# Patient Record
Sex: FEMALE | Race: OTHER | NOT HISPANIC OR LATINO
[De-identification: names, ages, dates, MRNs, and addresses within clinical notes are randomized per-mention and may not be internally consistent; named-entity substitution may affect disease eponyms.]

---

## 2018-07-25 ENCOUNTER — APPOINTMENT (OUTPATIENT)
Dept: PULMONOLOGY | Facility: CLINIC | Age: 63
End: 2018-07-25
Payer: COMMERCIAL

## 2018-07-25 VITALS
WEIGHT: 250 LBS | OXYGEN SATURATION: 98 % | DIASTOLIC BLOOD PRESSURE: 70 MMHG | SYSTOLIC BLOOD PRESSURE: 120 MMHG | HEART RATE: 70 BPM | HEIGHT: 69 IN | BODY MASS INDEX: 37.03 KG/M2 | RESPIRATION RATE: 12 BRPM | TEMPERATURE: 98.3 F

## 2018-07-25 DIAGNOSIS — E66.01 MORBID (SEVERE) OBESITY DUE TO EXCESS CALORIES: ICD-10-CM

## 2018-07-25 DIAGNOSIS — R06.83 SNORING: ICD-10-CM

## 2018-07-25 DIAGNOSIS — R06.2 WHEEZING: ICD-10-CM

## 2018-07-25 DIAGNOSIS — I10 ESSENTIAL (PRIMARY) HYPERTENSION: ICD-10-CM

## 2018-07-25 DIAGNOSIS — R06.02 SHORTNESS OF BREATH: ICD-10-CM

## 2018-07-25 DIAGNOSIS — Z87.891 PERSONAL HISTORY OF NICOTINE DEPENDENCE: ICD-10-CM

## 2018-07-25 PROBLEM — Z00.00 ENCOUNTER FOR PREVENTIVE HEALTH EXAMINATION: Status: ACTIVE | Noted: 2018-07-25

## 2018-07-25 PROCEDURE — 99244 OFF/OP CNSLTJ NEW/EST MOD 40: CPT | Mod: 25

## 2018-07-25 PROCEDURE — 94060 EVALUATION OF WHEEZING: CPT

## 2018-07-25 PROCEDURE — 94729 DIFFUSING CAPACITY: CPT

## 2018-07-25 PROCEDURE — 94727 GAS DIL/WSHOT DETER LNG VOL: CPT

## 2018-07-25 RX ORDER — QUETIAPINE 50 MG/1
50 TABLET, FILM COATED ORAL
Refills: 0 | Status: ACTIVE | COMMUNITY

## 2018-07-25 RX ORDER — LITHIUM CARBONATE 600 MG/1
600 CAPSULE ORAL
Refills: 0 | Status: ACTIVE | COMMUNITY

## 2018-07-25 RX ORDER — LEVOTHYROXINE SODIUM 0.09 MG/1
88 TABLET ORAL
Refills: 0 | Status: ACTIVE | COMMUNITY

## 2018-07-25 RX ORDER — LAMOTRIGINE 200 MG/1
200 TABLET, ORALLY DISINTEGRATING ORAL
Refills: 0 | Status: ACTIVE | COMMUNITY

## 2018-07-25 RX ORDER — GABAPENTIN 100 MG/1
100 CAPSULE ORAL
Refills: 0 | Status: ACTIVE | COMMUNITY

## 2018-07-26 PROBLEM — R06.2 WHEEZING SYMPTOM: Status: ACTIVE | Noted: 2018-07-26

## 2018-10-03 ENCOUNTER — RESULT CHARGE (OUTPATIENT)
Age: 63
End: 2018-10-03

## 2021-10-06 PROBLEM — I10 ESSENTIAL HYPERTENSION: Status: ACTIVE | Noted: 2018-07-25

## 2022-03-12 ENCOUNTER — TRANSCRIPTION ENCOUNTER (OUTPATIENT)
Age: 67
End: 2022-03-12

## 2022-03-12 ENCOUNTER — OUTPATIENT (OUTPATIENT)
Dept: OUTPATIENT SERVICES | Facility: HOSPITAL | Age: 67
LOS: 1 days | End: 2022-03-12

## 2022-03-12 ENCOUNTER — APPOINTMENT (OUTPATIENT)
Dept: ULTRASOUND IMAGING | Facility: CLINIC | Age: 67
End: 2022-03-12
Payer: COMMERCIAL

## 2022-03-12 PROCEDURE — 76856 US EXAM PELVIC COMPLETE: CPT | Mod: 26

## 2022-03-12 PROCEDURE — 76700 US EXAM ABDOM COMPLETE: CPT | Mod: 26

## 2022-04-16 ENCOUNTER — OUTPATIENT (OUTPATIENT)
Dept: OUTPATIENT SERVICES | Facility: HOSPITAL | Age: 67
LOS: 1 days | End: 2022-04-16

## 2022-04-16 ENCOUNTER — APPOINTMENT (OUTPATIENT)
Dept: ULTRASOUND IMAGING | Facility: CLINIC | Age: 67
End: 2022-04-16
Payer: MEDICARE

## 2022-04-16 PROCEDURE — 76770 US EXAM ABDO BACK WALL COMP: CPT | Mod: 26

## 2023-06-06 ENCOUNTER — APPOINTMENT (OUTPATIENT)
Dept: ULTRASOUND IMAGING | Facility: CLINIC | Age: 68
End: 2023-06-06
Payer: MEDICARE

## 2023-06-06 ENCOUNTER — OUTPATIENT (OUTPATIENT)
Dept: OUTPATIENT SERVICES | Facility: HOSPITAL | Age: 68
LOS: 1 days | End: 2023-06-06

## 2023-06-06 PROCEDURE — 76857 US EXAM PELVIC LIMITED: CPT | Mod: 26

## 2023-06-06 PROCEDURE — 76700 US EXAM ABDOM COMPLETE: CPT | Mod: 26

## 2023-10-27 ENCOUNTER — APPOINTMENT (OUTPATIENT)
Dept: ULTRASOUND IMAGING | Facility: CLINIC | Age: 68
End: 2023-10-27
Payer: MEDICARE

## 2023-10-27 ENCOUNTER — OUTPATIENT (OUTPATIENT)
Dept: OUTPATIENT SERVICES | Facility: HOSPITAL | Age: 68
LOS: 1 days | End: 2023-10-27

## 2023-10-27 PROCEDURE — 76830 TRANSVAGINAL US NON-OB: CPT | Mod: 26

## 2023-10-27 PROCEDURE — 76700 US EXAM ABDOM COMPLETE: CPT | Mod: 26

## 2023-10-27 PROCEDURE — 76856 US EXAM PELVIC COMPLETE: CPT | Mod: 26,59

## 2023-11-13 ENCOUNTER — APPOINTMENT (OUTPATIENT)
Dept: PULMONOLOGY | Facility: CLINIC | Age: 68
End: 2023-11-13
Payer: MEDICARE

## 2023-11-13 VITALS
DIASTOLIC BLOOD PRESSURE: 62 MMHG | SYSTOLIC BLOOD PRESSURE: 118 MMHG | WEIGHT: 253 LBS | OXYGEN SATURATION: 98 % | HEART RATE: 74 BPM | HEIGHT: 69 IN | BODY MASS INDEX: 37.47 KG/M2 | TEMPERATURE: 97.2 F

## 2023-11-13 DIAGNOSIS — G47.33 OBSTRUCTIVE SLEEP APNEA (ADULT) (PEDIATRIC): ICD-10-CM

## 2023-11-13 DIAGNOSIS — Z23 ENCOUNTER FOR IMMUNIZATION: ICD-10-CM

## 2023-11-13 PROCEDURE — 99204 OFFICE O/P NEW MOD 45 MIN: CPT | Mod: 25

## 2023-11-13 PROCEDURE — G0009: CPT

## 2023-11-13 PROCEDURE — 90677 PCV20 VACCINE IM: CPT

## 2023-11-13 RX ORDER — ASPIRIN 81 MG
81 TABLET, DELAYED RELEASE (ENTERIC COATED) ORAL
Refills: 0 | Status: ACTIVE | COMMUNITY

## 2023-11-13 RX ORDER — LOSARTAN POTASSIUM 25 MG/1
25 TABLET, FILM COATED ORAL
Refills: 0 | Status: ACTIVE | COMMUNITY

## 2023-11-13 RX ORDER — SEMAGLUTIDE 1.34 MG/ML
INJECTION, SOLUTION SUBCUTANEOUS
Refills: 0 | Status: ACTIVE | COMMUNITY

## 2023-11-13 RX ORDER — CARVEDILOL 12.5 MG/1
12.5 TABLET, FILM COATED ORAL
Refills: 0 | Status: DISCONTINUED | COMMUNITY
End: 2023-11-13

## 2023-11-20 ENCOUNTER — TRANSCRIPTION ENCOUNTER (OUTPATIENT)
Age: 68
End: 2023-11-20

## 2023-11-20 VITALS
SYSTOLIC BLOOD PRESSURE: 122 MMHG | TEMPERATURE: 97 F | HEIGHT: 68 IN | DIASTOLIC BLOOD PRESSURE: 74 MMHG | OXYGEN SATURATION: 97 % | HEART RATE: 75 BPM | RESPIRATION RATE: 16 BRPM | WEIGHT: 178.57 LBS

## 2023-11-20 RX ORDER — SEMAGLUTIDE 0.68 MG/ML
1 INJECTION, SOLUTION SUBCUTANEOUS
Refills: 0 | DISCHARGE

## 2023-11-20 RX ORDER — POLYETHYLENE GLYCOL 3350 17 G/17G
17 POWDER, FOR SOLUTION ORAL
Refills: 0 | DISCHARGE

## 2023-11-20 RX ORDER — AMLODIPINE BESYLATE 2.5 MG/1
1 TABLET ORAL
Refills: 0 | DISCHARGE

## 2023-11-20 RX ORDER — ASPIRIN/CALCIUM CARB/MAGNESIUM 324 MG
1 TABLET ORAL
Refills: 0 | DISCHARGE

## 2023-11-20 RX ORDER — QUETIAPINE FUMARATE 200 MG/1
1 TABLET, FILM COATED ORAL
Refills: 0 | DISCHARGE

## 2023-11-20 NOTE — ASU PATIENT PROFILE, ADULT - NSICDXPASTSURGICALHX_GEN_ALL_CORE_FT
PAST SURGICAL HISTORY:  H/O eye surgery laser repair of torn retina    H/O left breast biopsy benign    H/O:  section     History of cholecystectomy     History of laparoscopy endometriosis excision     PAST SURGICAL HISTORY:  H/O eye surgery laser repair of torn retina, left    H/O left breast biopsy benign    H/O:  section     History of cholecystectomy     History of laparoscopy endometriosis excision

## 2023-11-20 NOTE — ASU PATIENT PROFILE, ADULT - NSICDXPASTMEDICALHX_GEN_ALL_CORE_FT
PAST MEDICAL HISTORY:  Dyspnea on exertion     Fatty liver     GERD (gastroesophageal reflux disease)     H/O bipolar disorder     HLD (hyperlipidemia)     HTN (hypertension)     Hypothyroidism     RIAZ (obstructive sleep apnea)      PAST MEDICAL HISTORY:  GERD (gastroesophageal reflux disease)     H/O bipolar disorder     H/O: depression     HLD (hyperlipidemia)     HTN (hypertension)     Hypothyroidism     RIAZ (obstructive sleep apnea) CPAP
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
## 2023-11-20 NOTE — ASU PATIENT PROFILE, ADULT - NS TRANSFER EYEGLASSES PAIRS
Medication: zaleplon (SONATA)  Dosage: 10 mg  Sig: Take 1 capsule by mouth every night as needed for insomnia  Qty: 30 capsule 5 refill  Last Refill: 11/19/19  Last Office Visit for this diagnosis: 2/13/2020  Next Appt:  None scheduled  Pharmacy Verified:  YES  Patient informed that refill may take up to 48 hours:   NO   1 pair

## 2023-11-20 NOTE — ASU PATIENT PROFILE, ADULT - NS PRO AD PATIENT TYPE
Darwin Sands (son)-[phone]/Health Care Proxy (HCP) Darwin Sands (son)-[phone]/Health Care Proxy (HCP)

## 2023-11-20 NOTE — ASU PATIENT PROFILE, ADULT - MEDICATIONS BROUGHT TO HOSPITAL, PROFILE
ASSESSMENT & PLAN: Aditya Gamboa is a 44 y o  [id number] with normal gynecologic exam     1   Routine well woman exam done today  2    Pap and HPV:Pap with HPV was done today  Current ASCCP Guidelines reviewed  Last Pap  [unfilled] :  no abnormalities  3  Mammogram ordered  Recommend yearly mammography  4   Discussed what to expect with kj-menopausal and menopause  5  The patient is sexually active  She declined contraception and options have been discussed  6  The following were reviewed in today's visit: breast self exam, mammography screening ordered, family planning choices and menopause  7  Patient to return to office in 12 months for WWE  All questions have been answered to her satisfaction  CC:  Annual Gynecologic Examination    HPI: Aditya Gamboa is a 44 y o  [id number] who presents for annual gynecologic examination  She has the following concerns:  None  Menses are monthly  States they always use to be q28 days, now can be up to q35  Cycles last 4 days, without any heavy bleeding  Health Maintenance:    She wears her seatbelt routinely  She does perform regular monthly self breast exams  She feels safe at home  Past Medical History:   Diagnosis Date   • Abnormal Pap smear of cervix     about 12 years ago   • Amenorrhea    • Anxiety 2016    On lexapro   • BRCA1 negative    • BRCA2 negative    • Cervicitis    • Pregnancy     LAST ASSESSED: 12   • Varicella        Past Surgical History:   Procedure Laterality Date   • FLEXOR TENDON REPAIR Left 2015   • WISDOM TOOTH EXTRACTION Bilateral        Past OB/Gyn History:  Period Cycle (Days): 28  Period Duration (Days): 4-5  Period Pattern: Regular  Menstrual Flow: Moderate  Dysmenorrhea: NonePatient's last menstrual period was 2023 (exact date)    Menstrual History:  OB History        2    Para   2    Term   2            AB        Living   2       SAB        IAB        Ectopic        Multiple Live Births   2                  Patient's last menstrual period was 01/21/2023 (exact date)  Period Cycle (Days): 28  Period Duration (Days): 4-5  Period Pattern: Regular  Menstrual Flow: Moderate  Dysmenorrhea: None    History of sexually transmitted infection No  Patient is currently sexually active  heterosexual Birth control: condoms  Last Pap  [unfilled] :  no abnormalities  Family History   Problem Relation Age of Onset   • Ovarian cancer Mother         Age 45-currently living   • Cancer Mother         Ovarian cancer-25 years ago-living   • Hyperlipidemia Father    • No Known Problems Sister    • Colon cancer Paternal Grandmother         78   • Hypertension Paternal Grandfather        Social History:  Social History     Socioeconomic History   • Marital status: /Civil Union     Spouse name: Not on file   • Number of children: Not on file   • Years of education: Not on file   • Highest education level: Not on file   Occupational History   • Not on file   Tobacco Use   • Smoking status: Never   • Smokeless tobacco: Never   Vaping Use   • Vaping Use: Never used   Substance and Sexual Activity   • Alcohol use: Not Currently     Comment: BEING A SOCIAL DRINKER   • Drug use: No   • Sexual activity: Yes     Partners: Male     Birth control/protection: Condom Male   Other Topics Concern   • Not on file   Social History Narrative    NO CAFFEINE USE AS PER [employer]    COFFEE AS PER [employer]    EXERCISE FREQUENCY (TIMES/WEEK)    EXERCISING REGULARLY     Social Determinants of Health     Financial Resource Strain: Not on file   Food Insecurity: Not on file   Transportation Needs: Not on file   Physical Activity: Not on file   Stress: Not on file   Social Connections: Not on file   Intimate Partner Violence: Not on file   Housing Stability: Not on file     Presently lives with spouse and 2 kids  Patient is     Patient is currently employed nurse for [employer]  Allergies   Allergen Reactions   • Penicillins        Current Outpatient Medications:   •  escitalopram (LEXAPRO) 10 mg tablet, Take 1 tablet (10 mg total) by mouth daily, Disp: 90 tablet, Rfl: 1    Review of Systems:  Review of Systems   Constitutional: Negative for activity change, chills, fever and unexpected weight change  HENT: Negative for congestion, ear pain, hearing loss and sore throat  Respiratory: Negative for cough, chest tightness and shortness of breath  Cardiovascular: Negative for chest pain and leg swelling  Gastrointestinal: Negative for abdominal pain, constipation, diarrhea, nausea and vomiting  Genitourinary: Negative for difficulty urinating, dysuria, frequency, menstrual problem, pelvic pain, vaginal discharge and vaginal pain  Skin: Negative for color change and rash  Neurological: Negative for dizziness, numbness and headaches  Psychiatric/Behavioral: Negative for agitation and confusion  Physical Exam:  /84 (BP Location: Left arm, Patient Position: Sitting, Cuff Size: Large)   Ht 5' 3" (1 6 m)   Wt 92 1 kg (203 lb)   LMP 01/21/2023 (Exact Date)   BMI 35 96 kg/m²    Physical Exam  Constitutional:       General: She is not in acute distress  Appearance: Normal appearance  She is obese  Genitourinary:      Vulva, bladder, rectum and urethral meatus normal       No lesions in the vagina  Right Labia: No rash, tenderness or lesions  Left Labia: No tenderness, lesions or rash  No labial fusion noted  No vaginal discharge or tenderness  No vaginal prolapse present  No vaginal atrophy present  Right Adnexa: not tender, not full and no mass present  Left Adnexa: not tender, not full and no mass present  No cervical motion tenderness or friability  Uterus is not enlarged or prolapsed  Breasts:     Breasts are soft  Right: No inverted nipple, mass, nipple discharge or skin change        Left: No inverted nipple, mass, nipple discharge or skin change  HENT:      Head: Normocephalic and atraumatic  Nose: Nose normal    Eyes:      Conjunctiva/sclera: Conjunctivae normal       Pupils: Pupils are equal, round, and reactive to light  Cardiovascular:      Rate and Rhythm: Normal rate and regular rhythm  Pulses: Normal pulses  Heart sounds: Normal heart sounds  Pulmonary:      Effort: Pulmonary effort is normal  No respiratory distress  Breath sounds: Normal breath sounds  No wheezing  Abdominal:      General: Abdomen is flat  There is no distension  Palpations: Abdomen is soft  Tenderness: There is no abdominal tenderness  There is no guarding  Musculoskeletal:         General: Normal range of motion  Cervical back: Normal range of motion and neck supple  Neurological:      General: No focal deficit present  Mental Status: She is alert and oriented to person, place, and time  Mental status is at baseline  Skin:     General: Skin is warm and dry  Psychiatric:         Mood and Affect: Mood normal          Behavior: Behavior normal          Thought Content: Thought content normal          Judgment: Judgment normal    Vitals and nursing note reviewed  no

## 2023-11-21 ENCOUNTER — TRANSCRIPTION ENCOUNTER (OUTPATIENT)
Age: 68
End: 2023-11-21

## 2023-11-21 ENCOUNTER — OUTPATIENT (OUTPATIENT)
Dept: INPATIENT UNIT | Facility: HOSPITAL | Age: 68
LOS: 1 days | Discharge: HOME | End: 2023-11-21
Payer: MEDICARE

## 2023-11-21 VITALS
OXYGEN SATURATION: 96 % | SYSTOLIC BLOOD PRESSURE: 125 MMHG | HEART RATE: 63 BPM | RESPIRATION RATE: 14 BRPM | DIASTOLIC BLOOD PRESSURE: 62 MMHG

## 2023-11-21 DIAGNOSIS — Z98.890 OTHER SPECIFIED POSTPROCEDURAL STATES: Chronic | ICD-10-CM

## 2023-11-21 DIAGNOSIS — Z90.49 ACQUIRED ABSENCE OF OTHER SPECIFIED PARTS OF DIGESTIVE TRACT: Chronic | ICD-10-CM

## 2023-11-21 DIAGNOSIS — Z98.891 HISTORY OF UTERINE SCAR FROM PREVIOUS SURGERY: Chronic | ICD-10-CM

## 2023-11-21 LAB
BLD GP AB SCN SERPL QL: NEGATIVE — SIGNIFICANT CHANGE UP
BLD GP AB SCN SERPL QL: NEGATIVE — SIGNIFICANT CHANGE UP
RH IG SCN BLD-IMP: POSITIVE — SIGNIFICANT CHANGE UP
RH IG SCN BLD-IMP: POSITIVE — SIGNIFICANT CHANGE UP

## 2023-11-21 PROCEDURE — 86900 BLOOD TYPING SEROLOGIC ABO: CPT

## 2023-11-21 PROCEDURE — 88305 TISSUE EXAM BY PATHOLOGIST: CPT

## 2023-11-21 PROCEDURE — 58558 HYSTEROSCOPY BIOPSY: CPT

## 2023-11-21 PROCEDURE — 88305 TISSUE EXAM BY PATHOLOGIST: CPT | Mod: 26

## 2023-11-21 PROCEDURE — 86901 BLOOD TYPING SEROLOGIC RH(D): CPT

## 2023-11-21 PROCEDURE — 86850 RBC ANTIBODY SCREEN: CPT

## 2023-11-21 DEVICE — MYOSURE TISSUE REMOVAL DEVICE XL: Type: IMPLANTABLE DEVICE | Status: FUNCTIONAL

## 2023-11-21 DEVICE — MYOSURE TISSUE REMOVAL DEVICE REACH: Type: IMPLANTABLE DEVICE | Status: FUNCTIONAL

## 2023-11-21 RX ORDER — SODIUM CHLORIDE 9 MG/ML
1000 INJECTION, SOLUTION INTRAVENOUS
Refills: 0 | Status: DISCONTINUED | OUTPATIENT
Start: 2023-11-21 | End: 2023-11-21

## 2023-11-21 RX ORDER — QUETIAPINE FUMARATE 200 MG/1
3 TABLET, FILM COATED ORAL
Refills: 0 | DISCHARGE

## 2023-11-21 RX ORDER — ACETAMINOPHEN 500 MG
1000 TABLET ORAL EVERY 6 HOURS
Refills: 0 | Status: DISCONTINUED | OUTPATIENT
Start: 2023-11-21 | End: 2023-11-21

## 2023-11-21 RX ORDER — KETOROLAC TROMETHAMINE 30 MG/ML
30 SYRINGE (ML) INJECTION EVERY 8 HOURS
Refills: 0 | Status: DISCONTINUED | OUTPATIENT
Start: 2023-11-21 | End: 2023-11-21

## 2023-11-21 RX ORDER — ERGOCALCIFEROL 1.25 MG/1
1 CAPSULE ORAL
Refills: 0 | DISCHARGE

## 2023-11-21 RX ORDER — LAMOTRIGINE 25 MG/1
1 TABLET, ORALLY DISINTEGRATING ORAL
Refills: 0 | DISCHARGE

## 2023-11-21 RX ORDER — LITHIUM CARBONATE 300 MG/1
1 TABLET, EXTENDED RELEASE ORAL
Refills: 0 | DISCHARGE

## 2023-11-21 RX ORDER — LOSARTAN POTASSIUM 100 MG/1
1 TABLET, FILM COATED ORAL
Refills: 0 | DISCHARGE

## 2023-11-21 RX ORDER — GABAPENTIN 400 MG/1
1 CAPSULE ORAL
Refills: 0 | DISCHARGE

## 2023-11-21 RX ORDER — SODIUM CHLORIDE 9 MG/ML
1000 INJECTION INTRAMUSCULAR; INTRAVENOUS; SUBCUTANEOUS
Refills: 0 | Status: DISCONTINUED | OUTPATIENT
Start: 2023-11-21 | End: 2023-11-21

## 2023-11-21 RX ORDER — ROSUVASTATIN CALCIUM 5 MG/1
1 TABLET ORAL
Refills: 0 | DISCHARGE

## 2023-11-21 RX ORDER — LEVOTHYROXINE SODIUM 125 MCG
1 TABLET ORAL
Refills: 0 | DISCHARGE

## 2023-11-21 RX ORDER — ONDANSETRON 8 MG/1
8 TABLET, FILM COATED ORAL ONCE
Refills: 0 | Status: DISCONTINUED | OUTPATIENT
Start: 2023-11-21 | End: 2023-11-21

## 2023-11-21 NOTE — ASU DISCHARGE PLAN (ADULT/PEDIATRIC) - BATHING
Samples Given: Tremfya  100mg press injector pen\\nLOT LDS10. AF\\n03/2023 \\nNDS# 09787-284-95\\n\\nEnstilar spray Samples Given: Tremfya  100mg press injector pen\\nLOT LDS10. AF\\n03/2023 \\nNDS# 75741-495-12\\n\\nEnstilar spray Shower only

## 2023-11-21 NOTE — PRE-ANESTHESIA EVALUATION ADULT - ANESTHESIA, PREVIOUS REACTION, PROFILE
Take prescriptions as directed. You need to continue taking your protonix as directed instead of as needed, it will work better this way. Follow up with your primary care provider in 1 week for recheck and continued care and management. Return to the ED for worsening signs and symptoms or otherwise as needed.     
none

## 2023-11-21 NOTE — BRIEF OPERATIVE NOTE - OPERATION/FINDINGS
Normal external genitalia, atrophic vagina, stenotic cervix. Cervix serially dilated to #16 dilator, hysteroscope inserted. Small posterior R lateral submucosal fibroid resected w/ myosure. B/l ostia identified. Endocervical polyp removed w/

## 2023-11-21 NOTE — ASU DISCHARGE PLAN (ADULT/PEDIATRIC) - NS MD DC FALL RISK RISK
For information on Fall & Injury Prevention, visit: [email or web address] OR  [email or web address] OR  https://www.cdc.gov/steadi/patient.html

## 2023-11-21 NOTE — ASU DISCHARGE PLAN (ADULT/PEDIATRIC) - ASU DC SPECIAL INSTRUCTIONSFT
- Nothing in vagina - no intercourse, tampons, or douching until cleared by your doctor.   - Avoid swimming, tub baths, strenuous activity and heavy lifting until cleared by your doctor.   - Showering is ok.   - Continue oral pain medications as needed for pain.    - Follow up in office in 1-2 weeks for your postoperative visit.  Call the office to confirm your follow up appointment.   - Call the office sooner if you develop severe pain, heavy vaginal bleeding greater than 2 pads in 2 hours, or fevers greater than 100.4 F. Go to the closest emergency room for any of these symptoms if you are not able to contact your doctor. - Nothing in vagina - no intercourse, tampons, or douching until cleared by your doctor.   - Avoid swimming, tub baths, strenuous activity and heavy lifting until cleared by your doctor.   - Showering is ok.   - Continue oral pain medications as needed for pain.    - Follow up in office in 1-2 weeks for your postoperative visit.  Call the office to confirm your follow up appointment.   - Call the office sooner if you develop severe pain, heavy vaginal bleeding greater than 2 pads in 2 hours, or fevers greater than 100.4 F. Go to the closest emergency room for any of these symptoms if you are not able to contact your doctor.   - Continue all home medications

## 2023-11-21 NOTE — ASU DISCHARGE PLAN (ADULT/PEDIATRIC) - CARE PROVIDER_API CALL
Mahogany Gonzalez  Obstetrics and Gynecology  [address], [address]  [place] [address]  Phone: [phone]  Fax: [phone]  Established Patient  Follow Up Time:

## 2023-11-26 ENCOUNTER — EMERGENCY (EMERGENCY)
Facility: HOSPITAL | Age: 68
LOS: 1 days | Discharge: HOME | End: 2023-11-26
Attending: EMERGENCY MEDICINE | Admitting: EMERGENCY MEDICINE
Payer: MEDICARE

## 2023-11-26 VITALS
HEIGHT: 68 IN | RESPIRATION RATE: 18 BRPM | SYSTOLIC BLOOD PRESSURE: 135 MMHG | HEART RATE: 68 BPM | DIASTOLIC BLOOD PRESSURE: 68 MMHG | OXYGEN SATURATION: 98 % | TEMPERATURE: 98 F | WEIGHT: 179.9 LBS

## 2023-11-26 VITALS
SYSTOLIC BLOOD PRESSURE: 110 MMHG | TEMPERATURE: 98 F | DIASTOLIC BLOOD PRESSURE: 64 MMHG | RESPIRATION RATE: 17 BRPM | HEART RATE: 63 BPM | OXYGEN SATURATION: 95 %

## 2023-11-26 DIAGNOSIS — Z98.891 HISTORY OF UTERINE SCAR FROM PREVIOUS SURGERY: Chronic | ICD-10-CM

## 2023-11-26 DIAGNOSIS — Z98.890 OTHER SPECIFIED POSTPROCEDURAL STATES: Chronic | ICD-10-CM

## 2023-11-26 DIAGNOSIS — Z90.49 ACQUIRED ABSENCE OF OTHER SPECIFIED PARTS OF DIGESTIVE TRACT: Chronic | ICD-10-CM

## 2023-11-26 LAB
ALBUMIN SERPL ELPH-MCNC: 3.9 G/DL — SIGNIFICANT CHANGE UP (ref 3.3–5)
ALBUMIN SERPL ELPH-MCNC: 3.9 G/DL — SIGNIFICANT CHANGE UP (ref 3.3–5)
ALP SERPL-CCNC: 90 U/L — SIGNIFICANT CHANGE UP (ref 40–120)
ALP SERPL-CCNC: 90 U/L — SIGNIFICANT CHANGE UP (ref 40–120)
ALT FLD-CCNC: 39 U/L — SIGNIFICANT CHANGE UP (ref 10–45)
ALT FLD-CCNC: 39 U/L — SIGNIFICANT CHANGE UP (ref 10–45)
APPEARANCE UR: CLEAR — SIGNIFICANT CHANGE UP
APPEARANCE UR: CLEAR — SIGNIFICANT CHANGE UP
APTT BLD: 29.1 SEC — SIGNIFICANT CHANGE UP (ref 24.5–35.6)
APTT BLD: 29.1 SEC — SIGNIFICANT CHANGE UP (ref 24.5–35.6)
AST SERPL-CCNC: 39 U/L — SIGNIFICANT CHANGE UP (ref 10–40)
AST SERPL-CCNC: 39 U/L — SIGNIFICANT CHANGE UP (ref 10–40)
BACTERIA # UR AUTO: NEGATIVE /HPF — SIGNIFICANT CHANGE UP
BACTERIA # UR AUTO: NEGATIVE /HPF — SIGNIFICANT CHANGE UP
BILIRUB DIRECT SERPL-MCNC: 0.2 MG/DL — SIGNIFICANT CHANGE UP (ref 0–0.3)
BILIRUB DIRECT SERPL-MCNC: 0.2 MG/DL — SIGNIFICANT CHANGE UP (ref 0–0.3)
BILIRUB INDIRECT FLD-MCNC: 0.2 MG/DL — SIGNIFICANT CHANGE UP (ref 0.2–1)
BILIRUB INDIRECT FLD-MCNC: 0.2 MG/DL — SIGNIFICANT CHANGE UP (ref 0.2–1)
BILIRUB SERPL-MCNC: 0.4 MG/DL — SIGNIFICANT CHANGE UP (ref 0.2–1.2)
BILIRUB SERPL-MCNC: 0.4 MG/DL — SIGNIFICANT CHANGE UP (ref 0.2–1.2)
BILIRUB UR-MCNC: NEGATIVE — SIGNIFICANT CHANGE UP
BILIRUB UR-MCNC: NEGATIVE — SIGNIFICANT CHANGE UP
CAST: 1 /LPF — SIGNIFICANT CHANGE UP (ref 0–4)
CAST: 1 /LPF — SIGNIFICANT CHANGE UP (ref 0–4)
COLOR SPEC: YELLOW — SIGNIFICANT CHANGE UP
COLOR SPEC: YELLOW — SIGNIFICANT CHANGE UP
DIFF PNL FLD: ABNORMAL
DIFF PNL FLD: ABNORMAL
GLUCOSE UR QL: NEGATIVE MG/DL — SIGNIFICANT CHANGE UP
GLUCOSE UR QL: NEGATIVE MG/DL — SIGNIFICANT CHANGE UP
INR BLD: 0.98 — SIGNIFICANT CHANGE UP (ref 0.85–1.18)
INR BLD: 0.98 — SIGNIFICANT CHANGE UP (ref 0.85–1.18)
KETONES UR-MCNC: NEGATIVE MG/DL — SIGNIFICANT CHANGE UP
KETONES UR-MCNC: NEGATIVE MG/DL — SIGNIFICANT CHANGE UP
LEUKOCYTE ESTERASE UR-ACNC: ABNORMAL
LEUKOCYTE ESTERASE UR-ACNC: ABNORMAL
LIDOCAIN IGE QN: 41 U/L — SIGNIFICANT CHANGE UP (ref 7–60)
LIDOCAIN IGE QN: 41 U/L — SIGNIFICANT CHANGE UP (ref 7–60)
NITRITE UR-MCNC: NEGATIVE — SIGNIFICANT CHANGE UP
NITRITE UR-MCNC: NEGATIVE — SIGNIFICANT CHANGE UP
PH UR: 7 — SIGNIFICANT CHANGE UP (ref 5–8)
PH UR: 7 — SIGNIFICANT CHANGE UP (ref 5–8)
PROT SERPL-MCNC: 6.5 G/DL — SIGNIFICANT CHANGE UP (ref 6–8.3)
PROT SERPL-MCNC: 6.5 G/DL — SIGNIFICANT CHANGE UP (ref 6–8.3)
PROT UR-MCNC: NEGATIVE MG/DL — SIGNIFICANT CHANGE UP
PROT UR-MCNC: NEGATIVE MG/DL — SIGNIFICANT CHANGE UP
PROTHROM AB SERPL-ACNC: 11.2 SEC — SIGNIFICANT CHANGE UP (ref 9.5–13)
PROTHROM AB SERPL-ACNC: 11.2 SEC — SIGNIFICANT CHANGE UP (ref 9.5–13)
RBC CASTS # UR COMP ASSIST: 15 /HPF — HIGH (ref 0–4)
RBC CASTS # UR COMP ASSIST: 15 /HPF — HIGH (ref 0–4)
SP GR SPEC: >1.03 — HIGH (ref 1–1.03)
SP GR SPEC: >1.03 — HIGH (ref 1–1.03)
SQUAMOUS # UR AUTO: 0 /HPF — SIGNIFICANT CHANGE UP (ref 0–5)
SQUAMOUS # UR AUTO: 0 /HPF — SIGNIFICANT CHANGE UP (ref 0–5)
UROBILINOGEN FLD QL: 0.2 MG/DL — SIGNIFICANT CHANGE UP (ref 0.2–1)
UROBILINOGEN FLD QL: 0.2 MG/DL — SIGNIFICANT CHANGE UP (ref 0.2–1)
WBC UR QL: 1 /HPF — SIGNIFICANT CHANGE UP (ref 0–5)
WBC UR QL: 1 /HPF — SIGNIFICANT CHANGE UP (ref 0–5)

## 2023-11-26 PROCEDURE — 76856 US EXAM PELVIC COMPLETE: CPT | Mod: 26,59

## 2023-11-26 PROCEDURE — 93005 ELECTROCARDIOGRAM TRACING: CPT

## 2023-11-26 PROCEDURE — 80076 HEPATIC FUNCTION PANEL: CPT

## 2023-11-26 PROCEDURE — 85025 COMPLETE CBC W/AUTO DIFF WBC: CPT

## 2023-11-26 PROCEDURE — 93010 ELECTROCARDIOGRAM REPORT: CPT

## 2023-11-26 PROCEDURE — 76856 US EXAM PELVIC COMPLETE: CPT

## 2023-11-26 PROCEDURE — 85730 THROMBOPLASTIN TIME PARTIAL: CPT

## 2023-11-26 PROCEDURE — 85610 PROTHROMBIN TIME: CPT

## 2023-11-26 PROCEDURE — 76830 TRANSVAGINAL US NON-OB: CPT | Mod: 26

## 2023-11-26 PROCEDURE — 36415 COLL VENOUS BLD VENIPUNCTURE: CPT

## 2023-11-26 PROCEDURE — 83690 ASSAY OF LIPASE: CPT

## 2023-11-26 PROCEDURE — 74177 CT ABD & PELVIS W/CONTRAST: CPT | Mod: 26,MA

## 2023-11-26 PROCEDURE — 74177 CT ABD & PELVIS W/CONTRAST: CPT | Mod: MA

## 2023-11-26 PROCEDURE — 99285 EMERGENCY DEPT VISIT HI MDM: CPT | Mod: 25

## 2023-11-26 PROCEDURE — 99285 EMERGENCY DEPT VISIT HI MDM: CPT

## 2023-11-26 PROCEDURE — 80048 BASIC METABOLIC PNL TOTAL CA: CPT

## 2023-11-26 PROCEDURE — 87086 URINE CULTURE/COLONY COUNT: CPT

## 2023-11-26 PROCEDURE — 76830 TRANSVAGINAL US NON-OB: CPT

## 2023-11-26 PROCEDURE — 81001 URINALYSIS AUTO W/SCOPE: CPT

## 2023-11-26 RX ORDER — SODIUM CHLORIDE 9 MG/ML
1000 INJECTION INTRAMUSCULAR; INTRAVENOUS; SUBCUTANEOUS ONCE
Refills: 0 | Status: COMPLETED | OUTPATIENT
Start: 2023-11-26 | End: 2023-11-26

## 2023-11-26 RX ORDER — IOHEXOL 300 MG/ML
30 INJECTION, SOLUTION INTRAVENOUS ONCE
Refills: 0 | Status: COMPLETED | OUTPATIENT
Start: 2023-11-26 | End: 2023-11-26

## 2023-11-26 RX ADMIN — SODIUM CHLORIDE 1000 MILLILITER(S): 9 INJECTION INTRAMUSCULAR; INTRAVENOUS; SUBCUTANEOUS at 20:37

## 2023-11-26 RX ADMIN — IOHEXOL 30 MILLILITER(S): 300 INJECTION, SOLUTION INTRAVENOUS at 20:37

## 2023-11-26 NOTE — ED PROVIDER NOTE - PHYSICAL EXAMINATION
VITAL SIGNS: I have reviewed nursing notes and confirm.  CONSTITUTIONAL: Well-developed; well-nourished; in no acute distress.  SKIN: Agree with RN documentation regarding decubitus evaluation. Remainder of skin exam is warm and dry, no acute rash.  HEAD: Normocephalic; atraumatic.  EYES: PERRL, EOM intact; conjunctiva and sclera clear.  ENT: No nasal discharge; airway clear.  NECK: Supple; non tender.  CARD: S1, S2 normal; no murmurs, gallops, or rubs. Regular rate and rhythm.  RESP: No wheezes, rales or rhonchi.  ABD: Hypoactive bowel sounds. Mild distention. No guarding. Tenderness to RLQ. No hepatosplenomegaly.  EXT: Normal ROM. No clubbing, cyanosis or edema.  LYMPH: No acute cervical adenopathy.  NEURO: Alert, oriented. Grossly unremarkable.  PSYCH: Cooperative, appropriate.   : Deferred to GYN team

## 2023-11-26 NOTE — ED PROVIDER NOTE - PATIENT PORTAL LINK FT
You can access the FollowMyHealth Patient Portal offered by [facility] by registering at the following website: [email or web address] By joining [employer]’s FollowMyHealth portal, you will also be able to view your health information using other applications (apps) compatible with our system.

## 2023-11-26 NOTE — ED ADULT NURSE NOTE - OBJECTIVE STATEMENT
Pt is a 67y/o F presenting to the ED w/ c/o of nausea, non-bloody vomiting, decreased eating/drinking and RLQ pain upon palpation that began today, following [facility]." [facility] from [facility] showed "full colon." Pt took Miralax x3d, had formed, non-bloody BM today. Pt has PMHx D/C procedure 5d ago, hernia, HTN (took meds today), bipolar disorder, GERD. Pt denies CP, dizziness, lightheadedness, diarrhea, fever, chills, cramps, vaginal discharge, blood in stool/urine. Pt A/Ox3, speaking in clear/coherent sentences. Respirations easy, even and unlabored on room air. Pt resting comfortably in stretcher. 20g IV placed in R AC. Labs drawn.

## 2023-11-26 NOTE — ED ADULT TRIAGE NOTE - CHIEF COMPLAINT QUOTE
Patient to the ED from urgent care c/o RLQ pain and n/v x 1 day. D&C procedure on 11/21. Denies fevers. AAOX4, NAD.

## 2023-11-26 NOTE — ED PROVIDER NOTE - NSCAREINITIATED _GEN_ER
2021       Eva Sanchez MD  [address] [place] [address]  Via In Basket      Patient: Marisol Toth   YOB: 1975   Date of Visit: 2021       Dear Dr. Sanchez:    I saw your patient, Marisol Toth, for an evaluation. Below are my notes for this visit with her.    If you have questions, please do not hesitate to call me.      Sincerely,        Cristhian Méndez MD        CC: No Recipients  Cristhian Méndez MD  2021  6:05 PM  Signed    Patient: Marisol Toth Date: 2021   : 1975 Attending: Cristhian Méndez MD   46 year old female      PRINCIPLE DIAGNOSIS:      1.  History of chronic ITP first diagnosed in 2006 ultimately requiring treatment of him remaining in remission following therapy with rituximab which have been completed in 2018.    2.  Pulmonary emboli treated with 6 months of anticoagulation therapy with Pradaxa in .    3.  Erythema nodosum    Cancer Staging Summary for Marisol Toth           REASON FOR VISIT:    Marisol Toth has a history of chronic ITP which was first diagnosed in 2006.  She only had a transient response to prednisone with persistent thrombocytopenia as she tapered off prednisone.  She also had a history of iron deficiency anemia due to menstrual blood loss.    She has had periodic recurrence of her ITP and has in the past required treatment with intravenous gammaglobulin, pulse dexamethasone, and rituximab.  All treatment had been completed in 2018 with subsequent normalization of her platelet count since that time.  She did not respond to initial therapy with pulsed dexamethasone.    Her hematologic history is also remarkable for a bilateral pulmonary embolism in 2016.  No underlying thrombophilia have been identified.  She completed a total of six months of anticoagulation therapy with Pradaxa in 2017.     She was hospitalized for two days in 2021 for what was  felt to be a recurrence of her chronic ITP. She received treatment with IV dexamethasone, 1 unit platelets, and 2 dose of IVIG with improvement in platelet count.    When seen in office on August 24, 2021 it was recommended she begin treatment with rituximab weekly for 4 weeks with treatment regimen being administered as maintenance every 6 months for 2 years. She began treatment on August 27, 2021.     9/17/2021    She returns for labs and to receive her fourth infusion of rituximab.    It is important to note that her recent rheumatologic studies performed in late August 2021 have showed a high sed rate and slightly increased SSA value.  She saw her rheumatologist earlier this week. She had labs done at the time of that visit and are pending as of today.    She will be receiving maintenance rituximab weekly for 4 weeks once every 6 months for a total of 2 years.  She will begin maintenance therapy with rituximab in late February or early March 2022.    DETAILED HEMATOLOGY HISTORY:    Marisol Toth initially presented with a platelet count of 30,000 and April 2006.  Her vitamin B12 and folate level were normal.  A bone marrow biopsy and aspirate which was done on April 13, 2006 revealed increased megakaryocytes, absent iron stores and an otherwise normal bone marrow.  She was treated by another hematologist with a short course of prednisone.  She was actually able to discontinue prednisone altogether by July 2006.    She received treatment with oral iron for iron deficiency anemia which was attributed to menstrual blood loss.    In February 2010 she presented with acute onset thrombocytopenia where she a platelet count of 13,000.  A prior CBC which had been done 2 years earlier had been normal.  She received WinRho followed by prednisone.  She was completely tapered off prednisone as of May 2010.  He was decided that she would receive alternative treatment such as pulsed dexamethasone or Rituxan if her platelet count  failed to remain normal off prednisone.  She continued to be followed in our office and then by her primary care physician.  After while she was doing a CBC every year.  Platelet count had remained normal.    She was seen in the office on December 7, 2016 with petechiae, purpura, rectal bleeding and oral bleeding along with epistaxis.  Her platelet count was 5000 she was subsequently hospitalized.  She received treatment with intravenous gammaglobulin at a dose of 110 g IV daily for 2 infusions beginning on December 7, 2016.  She also received dexamethasone 40 mg daily for 5 doses.  With the first infusion of intravenous gammaglobulin she had wheezing and minimal shortness of breath.  That may have been from volume overload.  She responded to a nebulizer treatment.  With the second infusion of intravenous gamma globulin she was given furosemide 20 mg IV along with 50 mg of IV hydrocortisone.  She had no respiratory issues with the second infusion.    The plan is for her to receive a total of 6 cycles of therapy with dexamethasone dose of 40 mg daily given for 4 days every 4 weeks.    On December 15, 2016 she had a CT angiogram of the chest done as she had substernal chest pain and shortness of breath.  Bilateral pulmonary emboli were noted in the right lower lobe and in the lingula.   A venous Doppler study of the lower extremities show no evidence of a deep venous thrombosis.  Bilateral popliteal cysts were noted.  She subsequently had CT scan of the abdomen and pelvis done to make sure that she did not have an occult malignancy or source of embolism.  Subsegmental atelectasis and consolidation was seen in the lingular segment of left upper lobe and a small left pleural effusion.  No other significant abnormality was seen on the CT scan.    She was started on Lovenox during her hospitalization.  She was discharged on Pradaxa 150 mg every 12 hours.  A thrombophilia evaluation was done.  She had a lupus anticoagulant  test which was abnormal.  The factor V Leiden test was normal.  The prothrombin gene mutation test was normal.  The protein C and protein S activity levels were not low.  An anticardiolipin antibody test was negative and a beta 2 glycoprotein I antibody test was negative.    A follow-up CT angiogram of the chest from March 2, 2017 showed no evidence of a residual pulmonary embolism.  No abnormalities were noted within the lung.    Her sixth and last cycle of pulse dexamethasone therapy began on May 9, 2017.    She completed her 6 months of anticoagulation therapy in mid June 2017.    On January 5, 2018 she noted the presence of petechiae along with mild epistaxis and a tendency towards bruising.  Her platelet count was 22,000.  She received dexamethasone 40 mg daily for 4 days beginning on January 5, 2017.  Her platelet count normalized within a few days.    Following her appointment on January 10, 2018 it was recommended that she begin Rituxan weekly for 4 weeks starting on January 12, 2018.  She completed her fourth infusion of Rituxan on February 16, 2018.  She did not require any further treatment with pulse dexamethasone.    On August 16, 2021 she reported having petechiae on her lower extremities for 3 to 4 days. I recommended that she go to the ER to be evaluated given her history of chronic ITP and thrombocytopenia. She went to the ER however left after waiting for three hours after she was told it would take an additional 10 hours for her to be seen. She was seen in the office the next day by Dr. Montero and had a CBC done at that time which revealed a platelet count of 6,000. It was recommended she go to the emergency room for further evaluation. She was subsequently hospitalized for 2 days and received treatment with 1 unit platelet transfusion, IV dexamethasone 40 mg, and 2 doses of IVIG for what was felt to be a flare up of her chronic ITP. Her platelet count improved over her hospitalization and was  noted to be 74,000 the day of discharge.    When seen in office on August 24, 2021 it was recommended she begin treatment with rituximab weekly for 4 weeks with this treatment regimen being administered every 6 months for 2 years as maintenance to prevent recurrence of her chronic ITP. She began treatment on August 27, 2021.    Laboratory studies done in late August 2021 revealed a high sed rate with a normal CRP.  The SSA was also mildly elevated.    She still has periodic episodes of erythema nodosum. Was recommended she again follow-up with her rheumatologist.  The rheumatologist ordered additional studies but did not feel that she has Sjogren's syndrome.    She completed the first set of infusions of rituximab on September 17, 2021.  Plans are for her to receive maintenance therapy with rituximab weekly for 4 weeks for a total of 2 years with this to be started in late February 2022.    PAST MEDICAL HISTORY:    She has a history of ITP as noted above with exacerbations in 2006 and in 2010.  She has had a history of erythema nodosum first noted in 2010.  She was never required treatment for this.  She is a low vitamin D level.  She has had pneumonia on 3 occasions in the past along with an occasional episode of bronchitis.  She had childhood asthma.  She had a cholecystectomy in 2010.    She has a history of erythema nodosum.    She was hospitalized at [facility] in May 2018 from lumbar degenerative disc disease with pain radiating down both legs. This improved with nonsteroidal anti-inflammatory medications along with a Lidoderm patch and physical therapy.  Symptoms improved over time.    Past Medical History:   Diagnosis Date   • Arthritis    • Asthma    • Erythema nodosum    • ITP (idiopathic thrombocytopenic purpura)    • Stress incontinence         Past Surgical History:   Procedure Laterality Date   • Cholecystectomy  08/17/12        Family History   Problem Relation Age of Onset   • Cancer Mother 64         ovarian   • Thyroid Mother    • Other Father         arthritis unknown         has a past surgical history that includes Cholecystectomy (08/17/12).       SUBJECTIVE:     She has been doing well.  She tolerated rituximab without difficulty.    She still has periodic episodes of erythema nodosum.  This has not happened recently.  The rheumatologist did not think that she has Sjogren's disease.  Additional labs have been ordered.    Flonase has helped with her rhinorrhea.    She is not having the generalized achiness this week as she is not on a steroid withdrawal.    No fevers, chills or sweats.  She still has occasional joint stiffness and pain.  She agrees to go back to see her rheumatologist.    She has been feeling well. She has had no unexpected bruising or bleeding.  Her energy level is excellent.  No signs of any infection. No chest palpitations, cough, shortness of breath. No GI or  issues.     She has had genetic testing given her family history of ovarian cancer. This came back negative.     She has not had a Covid 19 vaccination as of yet as she was concerned about receiving anything that would stimulate her immune system.  I would recommend that she receives her Covid vaccine about 2 months after completing rituximab.    All systems reviewed and are negative with the except of the findings noted above.    VITAL SIGNS:    Oncology Encounter Vitals [09/17/21 0938]   ONC OP Encounter Vitals Group      /77      Heart Rate (!) 103      Resp 16      Temp 97.3 °F (36.3 °C)      Temp src       SpO2 98 %      Weight 249 lb 3.2 oz (113 kg)      Height 5' 10\" (1.778 m)      Pain Score  0      Pain Location       Pain Education?       BSA (Calculated - m2) - Rosina [employer] Rosina 2.29      BMI (Calculated) 35.76       Weight    09/17/21 0938   Weight: 113 kg (249 lb 3.2 oz)        ECOG [09/17/21 1013]   ECOG Performance Status 0         PHYSICAL EXAMINATION:    General Appearance:  Alert, cooperative, no distress,    HEENT:  Normal conjuctivae, no icterus, no oral lesions or stomatitis,   Neck: Neck Supple, No thyromegally, Normal cervical, supraclavicular or infraclavicular lymphadenopathy  Lungs:  Clear, Normal symmetrical breath sounds, no tachypnea, no rhonchi, wheezing or rales  Heart:  Regular rate and rhythm, S1and S2 normal, no murmur, rub or gallop,   Abdomen:  Soft, non-tender, bowel sounds normal, no masses, no hepatomegally or splenomegally  Extremities:  No cyanosis or edema, no signs of a DVT, no localized spine tenderness.    Skin: Skin turgor normal, no rash, no petechiae or ecchymoses. One area of erythema nodosum in the left lower extremity which is not particularly red.   Lymph Nodes: Normal axillary, epitrochlear, inguinal and femoral lymph nodes  Neurologic:  CNII-XII intact, normal mentation, normal strength and gait  Psych:  cooperative    LABORATORY RESULTS:    Office Visit on 09/10/2021   Component Date Value Ref Range Status   • Sodium 09/10/2021 139  135 - 145 mmol/L Final   • Potassium 09/10/2021 3.9  3.4 - 5.1 mmol/L Final   • Chloride 09/10/2021 105  98 - 107 mmol/L Final   • Carbon Dioxide 09/10/2021 28  21 - 32 mmol/L Final   • Anion Gap 09/10/2021 10  10 - 20 mmol/L Final   • Glucose 09/10/2021 119* 65 - 99 mg/dL Final   • BUN 09/10/2021 17  6 - 20 mg/dL Final   • Creatinine 09/10/2021 0.80  0.51 - 0.95 mg/dL Final   • Glomerular Filtration Rate 09/10/2021 89  >=60 Final    eGFR results = or >60 mL/min/1.73m2 = Normal kidney function. Estimated GFR calculated using the 2009 CKD-EPI creatinine equation.     • BUN/ Creatinine Ratio 09/10/2021 21  7 - 25 Final   • Calcium 09/10/2021 9.3  8.4 - 10.2 mg/dL Final   • Bilirubin, Total 09/10/2021 0.4  0.2 - 1.0 mg/dL Final   • GOT/AST 09/10/2021 19  <=37 Units/L Final   • GPT/ALT 09/10/2021 28  <64 Units/L Final   • Alkaline Phosphatase 09/10/2021 70  45 - 117 Units/L Final   • Albumin 09/10/2021 3.4* 3.6 - 5.1 g/dL Final   • Protein, Total 09/10/2021  7.0  6.4 - 8.2 g/dL Final   • Globulin 09/10/2021 3.6  2.0 - 4.0 g/dL Final   • A/G Ratio 09/10/2021 0.9* 1.0 - 2.4 Final   • WBC 09/10/2021 6.2  4.2 - 11.0 K/mcL Final   • RBC 09/10/2021 4.92  4.00 - 5.20 mil/mcL Final   • HGB 09/10/2021 12.9  12.0 - 15.5 g/dL Final   • HCT 09/10/2021 39.6  36.0 - 46.5 % Final   • MCV 09/10/2021 80.5  78.0 - 100.0 fl Final   • MCH 09/10/2021 26.2  26.0 - 34.0 pg Final   • MCHC 09/10/2021 32.6  32.0 - 36.5 g/dL Final   • RDW-CV 09/10/2021 13.2  11.0 - 15.0 % Final   • RDW-SD 09/10/2021 39.1  39.0 - 50.0 fL Final   • PLT 09/10/2021 240  140 - 450 K/mcL Final   • Neutrophil, Percent 09/10/2021 65  % Final   • Lymphocytes, Percent 09/10/2021 26  % Final   • Mono, Percent 09/10/2021 5  % Final   • Eosinophils, Percent 09/10/2021 3  % Final   • Basophils, Percent 09/10/2021 1  % Final   • Immature Granulocytes 09/10/2021 0  % Final   • Absolute Neutrophils 09/10/2021 4.1  1.8 - 7.7 K/mcL Final   • Absolute Lymphocytes 09/10/2021 1.6  1.0 - 4.8 K/mcL Final   • Absolute Monocytes 09/10/2021 0.3  0.3 - 0.9 K/mcL Final   • Absolute Eosinophils  09/10/2021 0.2  0.0 - 0.5 K/mcL Final   • Absolute Basophils 09/10/2021 0.0  0.0 - 0.3 K/mcL Final   • Absolute Immmature Granulocytes 09/10/2021 0.0  0.0 - 0.2 K/mcL Final          GOT/AST (Units/L)   Date Value   09/10/2021 19     GPT/ALT (Units/L)   Date Value   09/10/2021 28     No results found for: GGTP  Alkaline Phosphatase (Units/L)   Date Value   09/10/2021 70     Bilirubin, Total (mg/dL)   Date Value   09/10/2021 0.4     Sodium (mmol/L)   Date Value   09/10/2021 139     Potassium (mmol/L)   Date Value   09/10/2021 3.9     Chloride (mmol/L)   Date Value   09/10/2021 105     Carbon Dioxide (mmol/L)   Date Value   09/10/2021 28     BUN (mg/dL)   Date Value   09/10/2021 17     Creatinine (mg/dL)   Date Value   09/10/2021 0.80     Glucose (mg/dL)   Date Value   09/10/2021 119 (H)       RADIOLOGY RESULTS:    n/a    MEDICATIONS:    Current  Outpatient Medications   Medication Sig Dispense Refill   • fluticasone (VERAMYST) 27.5 MCG/SPRAY nasal spray Spray 1 spray in each nostril daily.     • albuterol 108 (90 Base) MCG/ACT inhaler Inhale 2 puffs into the lungs every 4 hours as needed for Shortness of Breath or Wheezing.     • dexamethasone (DECADRON) 4 MG tablet On 8-20-21, take 10 pills ( 40 mg) as directed 10 tablet 0   • calcium-vitamin D (OSCAL-500) 500-200 MG-UNIT per tablet Take 1 tablet by mouth daily.      • pseudoephedrine (Sudafed 12 Hour) 120 MG 12 hr tablet Take 120 mg by mouth at bedtime.      • cetirizine (ZYRTEC) 10 MG tablet Take 10 mg by mouth 2 times daily.       No current facility-administered medications for this visit.        ALLERGIES:    ALLERGIES:   Allergen Reactions   • Fish    • Flu Virus Vaccine Other (See Comments)   • Mold   (Environmental)    • Nuts   (Food)    • Shellfish Allergy   (Food Or Med)         IMPRESSION/PLAN:     1.  Chronic ITP.      While she has had a response to prior treatment with pulse dexamethasone she developed a recurrence of her ITP with then 6 months of completing her pulse dexamethasone therapy.  I recommended for that reason that she go on to receive Rituxan after 1 course of pulse dexamethasone January 2018 result in improvement in her platelet count.     She has had a prior response to treatment with rituximab in the past.    She now has evidence of recurrent chronic ITP.  It had been approximately just over 3 1/2  years between when she required treatment for chronic ITP in the past and her current episode of ITP.    She does not have a clear diagnosis for rheumatologic problem. Her rheumatologist previously felt that she did not have a rheumatologic problem. It is possible that she has an undiagnosed or difficult to characterize immunologic problem.  She has had no recent events occur (infection or antigenic stimulants) that would have contributed to the current episode of ITP.     It is my  opinion that she has an underlying rheumatologic problem. She saw her rheumatologist earlier this week who is having her do several studies. It is possible that the rheumatologist may suggest that she go on a medication such as Plaquenil.  Either way I would recommend that she also receive maintenance rituximab.    She saw the rheumatologist.  Additional laboratory studies were ordered by the rheumatologist.  She was not started on any medication such as Plaquenil as of yet.  I will speak with her rheumatologist once the additional laboratory studies come back.    She would receive rituximab weekly for 4 weeks once every 6 months for 2 years rather than rituximab every 2 months.  She will start maintenance therapy with rituximab in late February or early March 2022.    At the present time she will continue rituximab weekly for 4 weeks.  If necessary she will receive another round of treatment with pulse dexamethasone.  I do not plan to give her scheduled treatment with pulse dexamethasone and I will recommend using this only if indicated based upon results of frequent CBC studies.  The 6 cycles of pulse dexamethasone was not effective.    Proceed with rituximab today per orders.    At the present time she will hold off on getting the COVID-19 vaccination.  I would recommend that she get set 2 months after completing rituximab.    2.  History of bilateral pulmonary emboli.     She completed her 6 months of anticoagulation therapy in mid June 2017. She has not had recurrent thrombosis since. She will continue efforts to lose weight and remain physically active. She knows to remain active when traveling. She may benefit from compression stockings.     3. Family history of ovarian cancer.      She had genetic testing done that was negative.     4.  Joint symptoms and history of erythema nodosum.      She saw her rheumatologist who recommended that additional labs to be done.  This was ordered today.  Any decisions  regarding additional treatment options other than rituximab will be determined by the rheumatologist.    She is to have CBC checked monthly and return to see me in 3 months.  I will follow her sed rate and the SSA and SSB antibody studies at the time of my visits with her.    Prognosis: Good  Treatment Intent: Cure      Recent PHQ 2/9 Score    PHQ 2:  Date Adult PHQ 2 Score Adult PHQ 2 Interpretation   9/17/2021 0 No further screening needed       PHQ 9:       Distress Score   Distress Management Group      Distress Scale (0-10)          Time Spent:    25 minutes.  More than 50% of the time was spent in face to face discussion with patient.  Moderate complexity.    Cristhian Méndez MD     Discussed with:  Patient                  Allie Juarez(Attending)

## 2023-11-26 NOTE — ED PROVIDER NOTE - OBJECTIVE STATEMENT
69 y/o F with a PMHx of CAD, endometrial thickening (recent DNC 6d ago), and cholecystectomy presents to the ED c/o vaginal spotting which resolved with no other vaginal discharge. Pt also c/o new nausea and vomiting but denies any diarrhea. Pt has had constipation for several months but was able to move bowels this morning with no melena or blood. Pt initially had dysuria after surgery which has resolved since then and had a ocampo placed at the time of procedure.

## 2023-11-26 NOTE — ED PROVIDER NOTE - CLINICAL SUMMARY MEDICAL DECISION MAKING FREE TEXT BOX
69 y/o F with persistent abdominal discomfort and new onset of nausea and vomiting 1w after DNC. Plan to assess for post-op complications for appendicitis, coelitis, malignancy, diverticulitis, constipation. Will give IV bolus and omnipaque. Will obtain labs including BMP, CBC, urine cultures, and UA. Will obtain imaging including US transvaginal and CT abdomen pelvis. GYN consulted. 67 y/o F with persistent abdominal discomfort and new onset of nausea and vomiting 1w after DNC. Plan to assess for post-op complications for appendicitis, colitis, malignancy, diverticulitis, constipation. Will give IV bolus and omnipaque. Will obtain labs including BMP, CBC, urine cultures, and UA. Will obtain imaging including US transvaginal and CT abdomen pelvis. GYN consulted.

## 2023-11-26 NOTE — ED PROVIDER NOTE - NSICDXPASTMEDICALHX_GEN_ALL_CORE_FT
PAST MEDICAL HISTORY:  GERD (gastroesophageal reflux disease)     H/O bipolar disorder     H/O: depression     HLD (hyperlipidemia)     HTN (hypertension)     Hypothyroidism     RIAZ (obstructive sleep apnea) CPAP

## 2023-11-26 NOTE — ED PROVIDER NOTE - NSFOLLOWUPINSTRUCTIONS_ED_ALL_ED_FT
Please take tylenol or motrin as needed for pain. Return for worsening symptoms, worsening pain. Please follow up with your GYN.     I hope you feel better soon, happy holidays!

## 2023-11-26 NOTE — ED ADULT TRIAGE NOTE - NS ED NURSE BANDS TYPE
Name band; Paramedian Forehead Flap Text: A decision was made to reconstruct the defect utilizing an interpolation axial flap and a staged reconstruction.  A telfa template was made of the defect.  This telfa template was then used to outline the paramedian forehead pedicle flap.  The donor area for the pedicle flap was then injected with anesthesia.  The flap was excised through the skin and subcutaneous tissue down to the layer of the underlying musculature.  The pedicle flap was carefully excised within this deep plane to maintain its blood supply.  The edges of the donor site were undermined.   The donor site was closed in a primary fashion.  The pedicle was then rotated into position and sutured.  Once the tube was sutured into place, adequate blood supply was confirmed with blanching and refill.  The pedicle was then wrapped with xeroform gauze and dressed appropriately with a telfa and gauze bandage to ensure continued blood supply and protect the attached pedicle.

## 2023-11-26 NOTE — ED PROVIDER NOTE - NSICDXPASTSURGICALHX_GEN_ALL_CORE_FT
PAST SURGICAL HISTORY:  H/O eye surgery laser repair of torn retina, left    H/O left breast biopsy benign    H/O:  section     History of cholecystectomy     History of laparoscopy endometriosis excision

## 2023-11-27 PROBLEM — E78.5 HYPERLIPIDEMIA, UNSPECIFIED: Chronic | Status: ACTIVE | Noted: 2023-11-20

## 2023-11-27 PROBLEM — K21.9 GASTRO-ESOPHAGEAL REFLUX DISEASE WITHOUT ESOPHAGITIS: Chronic | Status: ACTIVE | Noted: 2023-11-20

## 2023-11-27 PROBLEM — I10 ESSENTIAL (PRIMARY) HYPERTENSION: Chronic | Status: ACTIVE | Noted: 2023-11-20

## 2023-11-27 PROBLEM — Z86.59 PERSONAL HISTORY OF OTHER MENTAL AND BEHAVIORAL DISORDERS: Chronic | Status: ACTIVE | Noted: 2023-11-20

## 2023-11-27 PROBLEM — G47.33 OBSTRUCTIVE SLEEP APNEA (ADULT) (PEDIATRIC): Chronic | Status: ACTIVE | Noted: 2023-11-20

## 2023-11-27 PROBLEM — E03.9 HYPOTHYROIDISM, UNSPECIFIED: Chronic | Status: ACTIVE | Noted: 2023-11-20

## 2023-11-27 NOTE — CONSULT NOTE ADULT - SUBJECTIVE AND OBJECTIVE BOX
67yo  POD6 s/p hysteroscopic myomectomy for thickened endometrium presenting for nausea/vomiting and abdominal bloating. The patient reports that she has had chronic bloating for the past 2mo. She went to her PCP and was sent for a TVUS as a part of the workup. At that time, she was found to have thickened endometrium and was referred to Dr. Gonzalez for evaluation. She completed hysteroscopic myomectomy  and was discharged home POD0 after an uncomplicated case. On POD2, she went to urgent care for evaluation of a skin biopsy for concern for infection and also inquired about her abdominal bloating and was sent for an abdominal xray. She was told that she had "high stool burden" on xray. She then developed nausea and vomiting this morning. She reports 2 high volume episodes of vomiting this morning and her nausea has since resolved. She additionally reports RLQ abdominal pain that has persisted for the past 2mo. She states that she is not in pain at baseline, but has 5/10 pain when she presses on the area. Pt denies fever, chills, chest pain, SOB, vaginal bleeding.     OB/GYN Hx: ; pCS  twins  Denies hx of abnormal pap smears.  Hx of genital warts  Hx of endometriosis  PMHx: HTN, GERD, BPD, hypothyroidism   SHx: pCS , dx laparoscopy (endometriosis) , cholecystectomy , left breast lumpectomy   Meds: levothyroxine 88mcg, lithium 600mg qd, lamictal 200mg qd, seroquel 75mg qd, gabapentin 100mg qd, losartan 25mg qd   Allergies: NKDA    PHYSICAL EXAM:   Vital Signs Last 24 Hrs  T(C): 36.7 (2023 23:41), Max: 36.7 (2023 20:59)  T(F): 98 (2023 23:41), Max: 98 (2023 20:59)  HR: 63 (2023 23:41) (63 - 68)  BP: 110/64 (2023 23:41) (110/64 - 135/68)  RR: 17 (2023 23:41) (16 - 18)  SpO2: 95% (2023 23:41) (95% - 98%)    Parameters below as of 2023 23:41  Patient On (Oxygen Delivery Method): room air        **************************  Constitutional: NAD  Respiratory: Clear to ausculation bilaterally  Cardiovascular: regular rate and rhythm  Gastrointestinal: soft, mild tenderness to palpation of RLQ, positive bowel sounds, no rebound or guarding   Pelvic exam: deferred  Extremities: no calf tenderness or swelling    LABS:                        12.4   10.06 )-----------( 203      ( 2023 19:07 )             38.6         138  |  103  |  15  ----------------------------<  80  4.0   |  28  |  0.62    Ca    9.2      2023 19:07    TPro  6.5  /  Alb  3.9  /  TBili  0.4  /  DBili  0.2  /  AST  39  /  ALT  39  /  AlkPhos  90      PT/INR - ( 2023 21:31 )   PT: 11.2 sec;   INR: 0.98          PTT - ( 2023 21:31 )  PTT:29.1 sec  Urinalysis Basic - ( 2023 23:47 )    Color: Yellow / Appearance: Clear / SG: >1.030 / pH: x  Gluc: x / Ketone: Negative mg/dL  / Bili: Negative / Urobili: 0.2 mg/dL   Blood: x / Protein: Negative mg/dL / Nitrite: Negative   Leuk Esterase: Trace / RBC: 15 /HPF / WBC 1 /HPF   Sq Epi: x / Non Sq Epi: 0 /HPF / Bacteria: Negative /HPF          RADIOLOGY & ADDITIONAL STUDIES:    ACC: [id number] EXAM:  US TRANSVAGINAL   ORDERED BY: ANTOINETTE ROJAS     ACC: [id number] EXAM:  US PELVIC COMPLETE   ORDERED BY: ANTOINETTE ROJAS     PROCEDURE DATE:  2023          INTERPRETATION:  CLINICAL INFORMATION: Abdominal pain. Recent D&C for   endometrial thickening.    LMP: Postmenopausal    COMPARISON: Pelvic ultrasound from 10/27/2023.    TECHNIQUE:  Endovaginal and transabdominal pelvic sonogram. Color and Spectral   Doppler was performed.    FINDINGS:  Uterus: 6.1 x 3.4 x 4.3 cm. Within normal limits.  Endometrium: 13 mm. Thickened, avascular, and heterogenous endometrium.    Ovaries: Not visualized due to excessive bowel gas.    Fluid: None visualized.    IMPRESSION:  1.  Thickened, heterogenous, and avascular endometrium measuring up to   1.3 cm. Recommend correlation with recent D&C findings and follow-up with   gynecology.  2.  Nonvisualization of the ovaries due to excessive bowel gas.        --- End of Report ---          ROXANA IVY MD; Resident Radiologist  This document has been electronically signed.  ASTRID YANG MD; Attending Radiologist  This document has been electronically signed. 2023 11:32PM      ACC: [id number] EXAM:  CT ABDOMEN AND PELVIS OC IC   ORDERED BY: ANTOINETTE ROJAS     PROCEDURE DATE:  2023          INTERPRETATION:  CLINICAL INFORMATION: Abdominal pain, vomiting, and   constipation. Recent D&C for endometrial thickening.    COMPARISON: Abdominal and pelvic ultrasounds ultrasounds from 10/27/2023.    CONTRAST/COMPLICATIONS:  IV Contrast: Isovue 370  90 cc administered   10 cc discarded  Oral Contrast: Gastroview  Complications: None reported at time of study completion    PROCEDURE:  CT of the Abdomen and Pelvis was performed.  Sagittal and coronal reformats were performed.    FINDINGS:  LOWER CHEST: Tiny hiatal hernia.    LIVER: Within normal limits.  BILE DUCTS: Normal caliber.  GALLBLADDER: Cholecystectomy.  SPLEEN: Within normal limits.  PANCREAS: Within normal limits.  ADRENALS: Within normal limits.  KIDNEYS/URETERS: No renal stones or hydronephrosis. 1 cm right renal cyst   and 2 cm left renal cyst.    BLADDER: Within normal limits.  REPRODUCTIVE ORGANS: Redemonstration of thickened and heterogeneous   endometrium. Adnexae within normal limits.    BOWEL: Enteric contrast reaches the distal small bowel. No bowel   obstruction or appreciable wall thickening. Appendix is normal. Abundant   colonic feces.  PERITONEUM: No ascites.  VESSELS: Mild atherosclerotic changes.  RETROPERITONEUM/LYMPH NODES: No lymphadenopathy.  ABDOMINAL WALL: Small fat-containing umbilical hernia.  BONES: Degenerative changes.    IMPRESSION:  1.  No acute intra-abdominal pathology.  2.  Abundant colonic feces which corresponds with the provided history of   constipation.  3.  Redemonstration of thickened and heterogeneous endometrium. Continued   gynecologic follow-up is advised.        --- End of Report ---          ROXANA IVY MD; Resident Radiologist  This document has been electronically signed.  ASTRID YANG MD; Attending Radiologist  This document has been electronically signed. 2023 11:26PM   67yo  POD6 s/p hysteroscopic myomectomy for thickened endometrium presenting for nausea/vomiting and abdominal bloating. The patient reports that she has had chronic bloating for the past 2mo. She went to her PCP and was sent for a TVUS as a part of the workup. At that time, she was found to have thickened endometrium and was referred to Dr. Gonzalez for evaluation. She completed hysteroscopic myomectomy  and was discharged home POD0 after an uncomplicated case. On POD2, she went to urgent care for evaluation of a skin biopsy for concern for infection and also inquired about her abdominal bloating and was sent for an abdominal xray. She was told that she had "high stool burden" on xray. She then developed nausea and vomiting this morning. She reports 2 high volume episodes of vomiting this morning and her nausea has since resolved. She additionally reports RLQ abdominal pain that has persisted for the past 2mo. She states that she is not in pain at baseline, but has 5/10 pain when she presses on the area. Pt reports that she had 2 BM this week and that her BM are irregular. She is not currently on a bowel regimen but reports taking Miralax for the past 2 days after being told that the pain may be related to constipation after xray. Pt denies fever, chills, chest pain, SOB, vaginal bleeding.     OB/GYN Hx: ; pCS  twins  Denies hx of abnormal pap smears.  Hx of genital warts  Hx of endometriosis  PMHx: HTN, GERD, BPD, hypothyroidism   SHx: pCS , dx laparoscopy (endometriosis) , cholecystectomy , left breast lumpectomy   Meds: levothyroxine 88mcg, lithium 600mg qd, lamictal 200mg qd, seroquel 75mg qd, gabapentin 100mg qd, losartan 25mg qd   Allergies: NKDA    PHYSICAL EXAM:   Vital Signs Last 24 Hrs  T(C): 36.7 (2023 23:41), Max: 36.7 (2023 20:59)  T(F): 98 (2023 23:41), Max: 98 (2023 20:59)  HR: 63 (2023 23:41) (63 - 68)  BP: 110/64 (2023 23:41) (110/64 - 135/68)  RR: 17 (2023 23:41) (16 - 18)  SpO2: 95% (2023 23:41) (95% - 98%)    Parameters below as of 2023 23:41  Patient On (Oxygen Delivery Method): room air        **************************  Constitutional: NAD  Respiratory: Clear to ausculation bilaterally  Cardiovascular: regular rate and rhythm  Gastrointestinal: soft, mild tenderness to palpation of RLQ, positive bowel sounds, no rebound or guarding   Pelvic exam: deferred  Extremities: no calf tenderness or swelling    LABS:                        12.4   10.06 )-----------( 203      ( 2023 19:07 )             38.6         138  |  103  |  15  ----------------------------<  80  4.0   |  28  |  0.62    Ca    9.2      2023 19:07    TPro  6.5  /  Alb  3.9  /  TBili  0.4  /  DBili  0.2  /  AST  39  /  ALT  39  /  AlkPhos  90      PT/INR - ( 2023 21:31 )   PT: 11.2 sec;   INR: 0.98          PTT - ( 2023 21:31 )  PTT:29.1 sec  Urinalysis Basic - ( 2023 23:47 )    Color: Yellow / Appearance: Clear / SG: >1.030 / pH: x  Gluc: x / Ketone: Negative mg/dL  / Bili: Negative / Urobili: 0.2 mg/dL   Blood: x / Protein: Negative mg/dL / Nitrite: Negative   Leuk Esterase: Trace / RBC: 15 /HPF / WBC 1 /HPF   Sq Epi: x / Non Sq Epi: 0 /HPF / Bacteria: Negative /HPF          RADIOLOGY & ADDITIONAL STUDIES:    ACC: [id number] EXAM:  US TRANSVAGINAL   ORDERED BY: ANTOINETTE ROJAS     ACC: [id number] EXAM:  US PELVIC COMPLETE   ORDERED BY: ANTOINETTE ROJAS     PROCEDURE DATE:  2023          INTERPRETATION:  CLINICAL INFORMATION: Abdominal pain. Recent D&C for   endometrial thickening.    LMP: Postmenopausal    COMPARISON: Pelvic ultrasound from 10/27/2023.    TECHNIQUE:  Endovaginal and transabdominal pelvic sonogram. Color and Spectral   Doppler was performed.    FINDINGS:  Uterus: 6.1 x 3.4 x 4.3 cm. Within normal limits.  Endometrium: 13 mm. Thickened, avascular, and heterogenous endometrium.    Ovaries: Not visualized due to excessive bowel gas.    Fluid: None visualized.    IMPRESSION:  1.  Thickened, heterogenous, and avascular endometrium measuring up to   1.3 cm. Recommend correlation with recent D&C findings and follow-up with   gynecology.  2.  Nonvisualization of the ovaries due to excessive bowel gas.        --- End of Report ---          ROXANA IVY MD; Resident Radiologist  This document has been electronically signed.  ASTRID YANG MD; Attending Radiologist  This document has been electronically signed. 2023 11:32PM      ACC: [id number] EXAM:  CT ABDOMEN AND PELVIS OC IC   ORDERED BY: ANTOINETTE ROJAS     PROCEDURE DATE:  2023          INTERPRETATION:  CLINICAL INFORMATION: Abdominal pain, vomiting, and   constipation. Recent D&C for endometrial thickening.    COMPARISON: Abdominal and pelvic ultrasounds ultrasounds from 10/27/2023.    CONTRAST/COMPLICATIONS:  IV Contrast: Isovue 370  90 cc administered   10 cc discarded  Oral Contrast: Gastroview  Complications: None reported at time of study completion    PROCEDURE:  CT of the Abdomen and Pelvis was performed.  Sagittal and coronal reformats were performed.    FINDINGS:  LOWER CHEST: Tiny hiatal hernia.    LIVER: Within normal limits.  BILE DUCTS: Normal caliber.  GALLBLADDER: Cholecystectomy.  SPLEEN: Within normal limits.  PANCREAS: Within normal limits.  ADRENALS: Within normal limits.  KIDNEYS/URETERS: No renal stones or hydronephrosis. 1 cm right renal cyst   and 2 cm left renal cyst.    BLADDER: Within normal limits.  REPRODUCTIVE ORGANS: Redemonstration of thickened and heterogeneous   endometrium. Adnexae within normal limits.    BOWEL: Enteric contrast reaches the distal small bowel. No bowel   obstruction or appreciable wall thickening. Appendix is normal. Abundant   colonic feces.  PERITONEUM: No ascites.  VESSELS: Mild atherosclerotic changes.  RETROPERITONEUM/LYMPH NODES: No lymphadenopathy.  ABDOMINAL WALL: Small fat-containing umbilical hernia.  BONES: Degenerative changes.    IMPRESSION:  1.  No acute intra-abdominal pathology.  2.  Abundant colonic feces which corresponds with the provided history of   constipation.  3.  Redemonstration of thickened and heterogeneous endometrium. Continued   gynecologic follow-up is advised.        --- End of Report ---          ROXANA IVY MD; Resident Radiologist  This document has been electronically signed.  ASTRID YANG MD; Attending Radiologist  This document has been electronically signed. 2023 11:26PM

## 2023-11-27 NOTE — CONSULT NOTE ADULT - ASSESSMENT
69yo  POD6 s/p hysteroscopic myomectomy for thickened endometrium presenting for nausea/vomiting and abdominal bloating. CTLow clinical concern for post operative complication at this time.  69yo  POD6 s/p hysteroscopic myomectomy for thickened endometrium presenting for nausea/vomiting and abdominal bloating likely related to significant constipation. CT A/P demonstrates high stool burden. Low clinical concern that pain is related to post operative complication at this time given patient has unchanged sx prior to procedure.  -VSS, afebrile. Clinically and hemodynamically stable.   -Labs wnl, as above.   -CT A/P as above, "abundant colonic feces"  -TVUS as above, demonstrates thickened endometrium 1.3cm.   -Recommend bowel regimen of Colace, Senna, Miralax.   -No acute GYN intervention at this time.   -Pt discharged home by ED provider prior to GYN recommendations.     Alverto Palmer PGY2 d/w Dr. Dudley PGY3 67yo  POD6 s/p hysteroscopic myomectomy for thickened endometrium presenting for nausea/vomiting and abdominal bloating likely related to significant constipation. CT A/P demonstrates high stool burden. Low clinical concern that pain is related to post operative complication at this time given patient has unchanged sx prior to procedure.  -VSS, afebrile. Clinically and hemodynamically stable.   -Labs wnl, as above.   -CT A/P as above, "abundant colonic feces"  -TVUS as above, demonstrates thickened endometrium 1.3cm.   -Recommend bowel regimen of Colace, Senna, Miralax.   -No acute GYN intervention at this time.   -Pt discharged home by ED provider prior to GYN recommendations.   -Pt to f/u with Dr. Gonzalez for routine post operative care    Alverto Palmer PGY2 d/w Dr. Dudley PGY3 69yo  POD6 s/p hysteroscopic myomectomy for thickened endometrium presenting for nausea/vomiting and abdominal bloating likely related to significant constipation. CT A/P demonstrates high stool burden. Low clinical concern that pain is related to post operative complication at this time given patient has unchanged sx prior to procedure.  -VSS, afebrile. Clinically and hemodynamically stable.   -Labs wnl, as above.   -CT A/P as above, "abundant colonic feces"  -TVUS as above, demonstrates thickened endometrium 1.3cm.   -Recommend bowel regimen of Colace, Senna, Miralax.   -No acute GYN intervention at this time.   -Pt discharged home by ED provider prior to GYN recommendations.   -Pt to f/u with Dr. Gonzalez for routine post operative care    Alverto Palmer PGY2 d/w Dr. Gonzalez and Dr. Dudley PGY3

## 2023-11-28 LAB
CULTURE RESULTS: SIGNIFICANT CHANGE UP
CULTURE RESULTS: SIGNIFICANT CHANGE UP
SPECIMEN SOURCE: SIGNIFICANT CHANGE UP
SPECIMEN SOURCE: SIGNIFICANT CHANGE UP

## 2023-11-30 DIAGNOSIS — R11.2 NAUSEA WITH VOMITING, UNSPECIFIED: ICD-10-CM

## 2023-11-30 DIAGNOSIS — I25.10 ATHEROSCLEROTIC HEART DISEASE OF NATIVE CORONARY ARTERY WITHOUT ANGINA PECTORIS: ICD-10-CM

## 2023-11-30 DIAGNOSIS — R14.0 ABDOMINAL DISTENSION (GASEOUS): ICD-10-CM

## 2023-11-30 DIAGNOSIS — R93.89 ABNORMAL FINDINGS ON DIAGNOSTIC IMAGING OF OTHER SPECIFIED BODY STRUCTURES: ICD-10-CM

## 2023-11-30 DIAGNOSIS — K59.00 CONSTIPATION, UNSPECIFIED: ICD-10-CM

## 2023-11-30 DIAGNOSIS — Z90.49 ACQUIRED ABSENCE OF OTHER SPECIFIED PARTS OF DIGESTIVE TRACT: ICD-10-CM

## 2023-11-30 DIAGNOSIS — I44.0 ATRIOVENTRICULAR BLOCK, FIRST DEGREE: ICD-10-CM

## 2023-11-30 DIAGNOSIS — R30.0 DYSURIA: ICD-10-CM

## 2023-11-30 DIAGNOSIS — R10.31 RIGHT LOWER QUADRANT PAIN: ICD-10-CM

## 2023-11-30 LAB
SURGICAL PATHOLOGY STUDY: SIGNIFICANT CHANGE UP
SURGICAL PATHOLOGY STUDY: SIGNIFICANT CHANGE UP

## 2024-05-23 NOTE — ASU PATIENT PROFILE, ADULT - NS PRO MODE OF ARRIVAL
Multifactorial. Suspect 2/2 b/l SDH +/- component of ICU delirium     - Some family report of EtOH use prior to admission, unclear quantity; no evidence of EtOH w/d to date, monitor  - On phenobarb taper  - C/w thiamine/folate/MV   - Delirium precautions  - Improved this AM     ambulatory

## 2024-12-17 NOTE — ED ADULT NURSE NOTE - MODE OF DISCHARGE
Complete antibiotics as prescribed.  May take OTC probiotic, take 2 hours before or after antibiotic.  Stay hydrated, drink plenty of fluids.  May take tylenol or ibuprofen for any pain/fever. Follow package instructions. Take ibuprofen with food.  Salt water gargles as needed for sore throat.   Go to ER/ICC if symptoms persist/worsen.  
Ambulatory

## 2025-06-17 NOTE — ED ADULT NURSE NOTE - NS PRO AD PATIENT TYPE
[facility].   Darwin [place]ands (son)-[phone]/Health Care Proxy (HCP)

## 2025-09-12 ENCOUNTER — APPOINTMENT (OUTPATIENT)
Dept: ULTRASOUND IMAGING | Facility: CLINIC | Age: 70
End: 2025-09-12
Payer: MEDICARE

## 2025-09-12 ENCOUNTER — RESULT REVIEW (OUTPATIENT)
Age: 70
End: 2025-09-12

## 2025-09-12 PROCEDURE — 76700 US EXAM ABDOM COMPLETE: CPT | Mod: 26

## (undated) DEVICE — WARMING BLANKET UPPER ADULT

## (undated) DEVICE — SOL IRR BAG NS 0.9% 3000ML

## (undated) DEVICE — ELCTR PLASMA LOOP MEDIUM ANGLED 24FR 12-30 DEG

## (undated) DEVICE — DRSG TELFA 3 X 8

## (undated) DEVICE — TUBING AQUILEX INFLOW

## (undated) DEVICE — DRAPE TOWEL BLUE 17" X 24"

## (undated) DEVICE — VENODYNE/SCD SLEEVE CALF MEDIUM

## (undated) DEVICE — TUBING AQUILEX OUTFLOW

## (undated) DEVICE — GLV 8 PROTEXIS (WHITE)

## (undated) DEVICE — GOWN TRIMAX XXL

## (undated) DEVICE — TUBING TUR 2 PRONG

## (undated) DEVICE — MYOSURE CANISTER AQUILEX KIT

## (undated) DEVICE — PRESSURE INFUSOR BAG 3000ML

## (undated) DEVICE — DRAPE IRRIGATION POUCH 19X23"

## (undated) DEVICE — PACK D&C

## (undated) DEVICE — MYOSURE SCOPE SEAL